# Patient Record
Sex: MALE | Race: WHITE | NOT HISPANIC OR LATINO | URBAN - METROPOLITAN AREA
[De-identification: names, ages, dates, MRNs, and addresses within clinical notes are randomized per-mention and may not be internally consistent; named-entity substitution may affect disease eponyms.]

---

## 2021-04-22 ENCOUNTER — APPOINTMENT (OUTPATIENT)
Dept: URBAN - METROPOLITAN AREA CLINIC 217 | Age: 21
Setting detail: DERMATOLOGY
End: 2021-04-22

## 2021-04-22 VITALS — TEMPERATURE: 96.6 F

## 2021-04-22 DIAGNOSIS — B35.4 TINEA CORPORIS: ICD-10-CM

## 2021-04-22 PROCEDURE — OTHER COUNSELING: OTHER

## 2021-04-22 PROCEDURE — OTHER PRESCRIPTION: OTHER

## 2021-04-22 PROCEDURE — OTHER MIPS QUALITY: OTHER

## 2021-04-22 PROCEDURE — OTHER TREATMENT REGIMEN: OTHER

## 2021-04-22 PROCEDURE — 99203 OFFICE O/P NEW LOW 30 MIN: CPT

## 2021-04-22 RX ORDER — CICLOPIROX 7.7 MG/G
GEL TOPICAL
Qty: 1 | Refills: 3 | Status: ERX | COMMUNITY
Start: 2021-04-22

## 2021-04-22 ASSESSMENT — LOCATION DETAILED DESCRIPTION DERM
LOCATION DETAILED: RIGHT ACHILLES SKIN
LOCATION DETAILED: LEFT DORSAL FOOT
LOCATION DETAILED: LEFT HEEL
LOCATION DETAILED: RIGHT DORSAL FOOT
LOCATION DETAILED: LEFT PROXIMAL POSTERIOR THIGH
LOCATION DETAILED: RIGHT PROXIMAL POSTERIOR THIGH

## 2021-04-22 ASSESSMENT — LOCATION ZONE DERM
LOCATION ZONE: FEET
LOCATION ZONE: LEG

## 2021-04-22 ASSESSMENT — LOCATION SIMPLE DESCRIPTION DERM
LOCATION SIMPLE: RIGHT ACHILLES SKIN
LOCATION SIMPLE: RIGHT POSTERIOR THIGH
LOCATION SIMPLE: RIGHT FOOT
LOCATION SIMPLE: LEFT POSTERIOR THIGH
LOCATION SIMPLE: LEFT FOOT
LOCATION SIMPLE: LEFT HEEL

## 2021-04-22 ASSESSMENT — SEVERITY ASSESSMENT: SEVERITY: MODERATE

## 2021-04-22 NOTE — PROCEDURE: TREATMENT REGIMEN
Initiate Treatment: ciclopirox 0.77% topical gel. Apply to fungal rash of buttocks, posterior bilateral thighs, feet and web spaces BID PRN
Detail Level: Detailed

## 2021-04-22 NOTE — HPI: RASH
What Type Of Note Output Would You Prefer (Optional)?: Standard Output
Is The Patient Presenting As Previously Scheduled?: No, they are a work-in
How Severe Is Your Rash?: moderate
Is This A New Presentation, Or A Follow-Up?: Rash
Additional History: Patient is with mom. He is autistic and lives at [facility]. She feels they were putting the wrong medication on his rash and when they resumed the anti fungal, he did start to improve. She would like his toenails evaluated today. The6 have been applying topical terbinafine.\\n\\nPatient was screened before evaluation for COVID-19 by inquiring about fever, shortness of breath, weakness, fatigue, loss of taste or smell, and gastrointestinal symptoms.  Patient denied any of the above.  Temperature was taken and patient was afebrile.

## 2022-09-01 ENCOUNTER — APPOINTMENT (OUTPATIENT)
Dept: URBAN - METROPOLITAN AREA CLINIC 217 | Age: 22
Setting detail: DERMATOLOGY
End: 2022-09-01

## 2022-09-01 DIAGNOSIS — B35.4 TINEA CORPORIS: ICD-10-CM

## 2022-09-01 PROCEDURE — 99213 OFFICE O/P EST LOW 20 MIN: CPT

## 2022-09-01 PROCEDURE — OTHER TREATMENT REGIMEN: OTHER

## 2022-09-01 PROCEDURE — OTHER COUNSELING: OTHER

## 2022-09-01 PROCEDURE — OTHER ORDER TESTS: OTHER

## 2022-09-01 PROCEDURE — OTHER PRESCRIPTION: OTHER

## 2022-09-01 RX ORDER — CICLOPIROX OLAMINE 7.7 MG/G
CREAM TOPICAL
Qty: 1 | Refills: 3 | Status: ERX | COMMUNITY
Start: 2022-09-01

## 2022-09-01 RX ORDER — TERBINAFINE HYDROCHLORIDE 250 MG/1
TABLET ORAL
Qty: 30 | Refills: 2 | Status: ERX | COMMUNITY
Start: 2022-09-01

## 2022-09-01 ASSESSMENT — LOCATION SIMPLE DESCRIPTION DERM
LOCATION SIMPLE: LEFT POSTERIOR THIGH
LOCATION SIMPLE: LEFT HEEL
LOCATION SIMPLE: RIGHT POSTERIOR THIGH
LOCATION SIMPLE: RIGHT ACHILLES SKIN
LOCATION SIMPLE: RIGHT FOOT
LOCATION SIMPLE: LEFT FOOT

## 2022-09-01 ASSESSMENT — LOCATION ZONE DERM
LOCATION ZONE: LEG
LOCATION ZONE: FEET

## 2022-09-01 ASSESSMENT — SEVERITY ASSESSMENT: SEVERITY: MODERATE

## 2022-09-01 ASSESSMENT — LOCATION DETAILED DESCRIPTION DERM
LOCATION DETAILED: RIGHT DORSAL FOOT
LOCATION DETAILED: RIGHT ACHILLES SKIN
LOCATION DETAILED: RIGHT PROXIMAL POSTERIOR THIGH
LOCATION DETAILED: LEFT HEEL
LOCATION DETAILED: LEFT PROXIMAL POSTERIOR THIGH
LOCATION DETAILED: LEFT DORSAL FOOT

## 2022-09-01 NOTE — PROCEDURE: TREATMENT REGIMEN
Initiate Treatment: Ciclopirox 0.77% cream BID PRN\\nTerbinafine 250 mg PO daily x3 months (due to toenail involvement)
Detail Level: Detailed
Plan: Recent blood work by PCP reviewed and CMP WNL. Mutually agree to pursue systemic treatment in addition to topical at this time due to extensiveness on toenails. Will obtain KOH/fungal culture. Advised mom have patient get repeat blood work 2-3 weeks into treatment. Lab order given at time of visit.

## 2022-10-13 ENCOUNTER — APPOINTMENT (OUTPATIENT)
Dept: URBAN - METROPOLITAN AREA CLINIC 217 | Age: 22
Setting detail: DERMATOLOGY
End: 2022-10-13

## 2022-10-13 DIAGNOSIS — B35.1 TINEA UNGUIUM: ICD-10-CM

## 2022-10-13 PROCEDURE — 99213 OFFICE O/P EST LOW 20 MIN: CPT

## 2022-10-13 PROCEDURE — OTHER TREATMENT REGIMEN: OTHER

## 2022-10-13 PROCEDURE — OTHER COUNSELING: OTHER

## 2022-10-13 NOTE — PROCEDURE: TREATMENT REGIMEN
Continue Regimen: Terbinafine 250 mg PO daily x3 months total
Discontinue Regimen: Ciclopirox 0.77% cream BID PRN
Plan: Recent labs reviewed with pt’s mom and WNL (see attachment)
Detail Level: Zone

## 2022-11-30 ENCOUNTER — APPOINTMENT (OUTPATIENT)
Dept: URBAN - METROPOLITAN AREA CLINIC 217 | Age: 22
Setting detail: DERMATOLOGY
End: 2022-11-30

## 2022-11-30 DIAGNOSIS — B35.1 TINEA UNGUIUM: ICD-10-CM

## 2022-11-30 PROCEDURE — 99213 OFFICE O/P EST LOW 20 MIN: CPT

## 2022-11-30 PROCEDURE — OTHER TREATMENT REGIMEN: OTHER

## 2022-11-30 PROCEDURE — OTHER COUNSELING: OTHER

## 2022-11-30 NOTE — PROCEDURE: TREATMENT REGIMEN
Continue Regimen: Finish Terbinafine 250 mg PO daily x3 months total then DC
Discontinue Regimen: Remain off Ciclopirox 0.77% cream. Can resume BID PRN if rash returns
Detail Level: Zone

## 2023-05-17 ENCOUNTER — APPOINTMENT (OUTPATIENT)
Dept: URBAN - METROPOLITAN AREA CLINIC 217 | Age: 23
Setting detail: DERMATOLOGY
End: 2023-05-17

## 2023-05-17 DIAGNOSIS — B35.1 TINEA UNGUIUM: ICD-10-CM

## 2023-05-17 PROCEDURE — OTHER PRESCRIPTION: OTHER

## 2023-05-17 PROCEDURE — 99213 OFFICE O/P EST LOW 20 MIN: CPT

## 2023-05-17 PROCEDURE — OTHER TREATMENT REGIMEN: OTHER

## 2023-05-17 PROCEDURE — OTHER COUNSELING: OTHER

## 2023-05-17 RX ORDER — CICLOPIROX OLAMINE 7.7 MG/G
CREAM TOPICAL
Qty: 90 | Refills: 3 | Status: ERX | COMMUNITY
Start: 2023-05-17

## 2023-05-17 RX ORDER — EFINACONAZOLE 100 MG/ML
SOLUTION TOPICAL
Qty: 8 | Refills: 3 | Status: ERX | COMMUNITY
Start: 2023-05-17

## 2023-05-17 NOTE — PROCEDURE: TREATMENT REGIMEN
Initiate Treatment: Resume Ciclopirox 0.77% cream BID PRN (to use on affected skin between web spaces until clear)\\nJublia solution HS to affected toenails
Continue Regimen: Finish Terbinafine 250 mg PO daily x3 months total then DC
Discontinue Regimen: Remain off Ciclopirox 0.77% cream. Can resume BID PRN if rash returns
Plan: Since mild at this time, mutually agree to treat conservatively at this time with topicals and avoid systemic treatment. If worsens despite topicals, may consider another round of PO Terbinafine in future
Detail Level: Zone
Samples Given: Jublia solution

## 2023-05-18 ENCOUNTER — RX ONLY (RX ONLY)
Age: 23
End: 2023-05-18

## 2023-05-18 RX ORDER — CICLOPIROX 80 MG/ML
SOLUTION TOPICAL
Qty: 6.6 | Refills: 1 | Status: ERX | COMMUNITY
Start: 2023-05-18

## 2023-05-23 ENCOUNTER — RX ONLY (RX ONLY)
Age: 23
End: 2023-05-23

## 2023-05-23 RX ORDER — CICLOPIROX 80 MG/ML
SOLUTION TOPICAL
Qty: 6.6 | Refills: 1 | Status: ERX

## 2023-08-15 ENCOUNTER — RX ONLY (RX ONLY)
Age: 23
End: 2023-08-15

## 2023-08-15 RX ORDER — CICLOPIROX 80 MG/ML
SOLUTION TOPICAL
Qty: 6.6 | Refills: 1 | Status: ERX

## 2023-11-15 ENCOUNTER — RX ONLY (RX ONLY)
Age: 23
End: 2023-11-15

## 2023-11-15 RX ORDER — CICLOPIROX 80 MG/ML
SOLUTION TOPICAL
Qty: 6.6 | Refills: 1 | Status: ERX